# Patient Record
Sex: MALE | Race: WHITE | NOT HISPANIC OR LATINO | Employment: UNEMPLOYED | URBAN - METROPOLITAN AREA
[De-identification: names, ages, dates, MRNs, and addresses within clinical notes are randomized per-mention and may not be internally consistent; named-entity substitution may affect disease eponyms.]

---

## 2018-01-16 NOTE — PROCEDURES
Procedures by Dhiraj Johnson MD at 8/2/2016  2:37 PM      Author:  Dhiraj Johnson MD Service:  Interventional Radiology  Author Type:  Physician     Filed:  8/2/2016  2:41 PM Date of Service:  8/2/2016  2:37 PM Status:  Signed     :  Dhiraj Johnson MD (Physician)         Procedure Orders:       1  Insert PICC line [[id number]] ordered by Dhiraj Johnson MD at 08/02/16 1437                    PICC Line Insertion  Date/Time: 8/2/2016 2:37 PM  Performed by: Ismael Frankel  Authorized by: Ismael Frankel     Patient location: Radiology  Other Assisting Provider:  No    Consent:     Consent obtained:  Written    Consent given by: Grandmother  Universal protocol:     Procedure explained and questions answered to patient or proxy's satisfaction: yes      Relevant documents present and verified: yes      Test results available and properly labeled: yes       Imaging studies available: yes      Required blood products, implants, devices, and special equipment available: yes      Site/side marked: no      Immediately prior to procedure, a time out was called: yes      Patient identity confirmed:  Arm band  Pre-procedure details:     Hand hygiene: Hand hygiene performed prior to insertion      Sterile barrier technique: All elements of maximal sterile technique followed    Indications:     PICC line indications: vascular access    Anesthesia (see MAR for exact dosages):      Anesthesia method:  Local infiltration    Local anesthetic:  Lidocaine 1% w/o epi  Procedure details:     Location:  Basilic    Vessel type: vein      Laterality:  Right    Approach: percutaneous technique used      Patient position:  Flat    Procedural supplies:  Double lumen    Catheter size:  5 5 Fr    Landmarks identified: yes      Ultrasound guidance: yes      Sterile ultrasound techniques: Sterile gel and sterile probe covers were used      Number of attempts:  1    Successful placement: yes      Vessel of catheter tip end:  Right axillary vein    Total catheter length (cm):  23    Catheter out on skin (cm):  0  Post-procedure details:     Post-procedure:  Dressing applied    Assessment:  Blood return through all ports and free fluid flow    Post-procedure complications: none      Patient tolerance of procedure: Tolerated well, no immediate complications  Comments:      Wire could not be passed to the SVC and the PICC is positioned in the right axillary vein with good blood flow                   Received for:Provider  EPIC   Aug  2 2016  2:40PM [facility] Standard Time

## 2018-08-27 ENCOUNTER — HOSPITAL ENCOUNTER (EMERGENCY)
Facility: HOSPITAL | Age: 33
Discharge: HOME | End: 2018-08-28
Attending: EMERGENCY MEDICINE
Payer: COMMERCIAL

## 2018-08-27 DIAGNOSIS — F10.10 ALCOHOL ABUSE: Primary | ICD-10-CM

## 2018-08-27 LAB
ALBUMIN SERPL BCP-MCNC: 3.8 G/DL (ref 3.5–5)
ALP SERPL-CCNC: 244 U/L (ref 46–116)
ALT SERPL W P-5'-P-CCNC: 64 U/L (ref 12–78)
AMMONIA PLAS-SCNC: 58 UMOL/L (ref 11–35)
ANION GAP SERPL CALCULATED.3IONS-SCNC: 9 MMOL/L (ref 4–13)
AST SERPL W P-5'-P-CCNC: 120 U/L (ref 5–45)
BASOPHILS # BLD AUTO: 0.02 THOUSANDS/ΜL (ref 0–0.1)
BASOPHILS NFR BLD AUTO: 0 % (ref 0–1)
BILIRUB DIRECT SERPL-MCNC: 2.6 MG/DL (ref 0–0.2)
BILIRUB SERPL-MCNC: 6 MG/DL (ref 0.2–1)
BUN SERPL-MCNC: 7 MG/DL (ref 5–25)
CALCIUM SERPL-MCNC: 9.1 MG/DL (ref 8.3–10.1)
CHLORIDE SERPL-SCNC: 100 MMOL/L (ref 100–108)
CO2 SERPL-SCNC: 26 MMOL/L (ref 21–32)
CREAT SERPL-MCNC: 1.05 MG/DL (ref 0.6–1.3)
EOSINOPHIL # BLD AUTO: 0.24 THOUSAND/ΜL (ref 0–0.61)
EOSINOPHIL NFR BLD AUTO: 3 % (ref 0–6)
ERYTHROCYTE [DISTWIDTH] IN BLOOD BY AUTOMATED COUNT: 16.8 % (ref 11.6–15.1)
ETHANOL SERPL-MCNC: 151 MG/DL (ref 0–3)
GFR SERPL CREATININE-BSD FRML MDRD: 94 ML/MIN/1.73SQ M
GLUCOSE SERPL-MCNC: 99 MG/DL (ref 65–140)
HCT VFR BLD AUTO: 37.7 % (ref 36.5–49.3)
HGB BLD-MCNC: 12.3 G/DL (ref 12–17)
IMM GRANULOCYTES # BLD AUTO: 0.03 THOUSAND/UL (ref 0–0.2)
IMM GRANULOCYTES NFR BLD AUTO: 0 % (ref 0–2)
LYMPHOCYTES # BLD AUTO: 1.42 THOUSANDS/ΜL (ref 0.6–4.47)
LYMPHOCYTES NFR BLD AUTO: 17 % (ref 14–44)
MCH RBC QN AUTO: 28.4 PG (ref 26.8–34.3)
MCHC RBC AUTO-ENTMCNC: 32.6 G/DL (ref 31.4–37.4)
MCV RBC AUTO: 87 FL (ref 82–98)
MONOCYTES # BLD AUTO: 0.84 THOUSAND/ΜL (ref 0.17–1.22)
MONOCYTES NFR BLD AUTO: 10 % (ref 4–12)
NEUTROPHILS # BLD AUTO: 5.68 THOUSANDS/ΜL (ref 1.85–7.62)
NEUTS SEG NFR BLD AUTO: 70 % (ref 43–75)
NRBC BLD AUTO-RTO: 0 /100 WBCS
PLATELET # BLD AUTO: 67 THOUSANDS/UL (ref 149–390)
POTASSIUM SERPL-SCNC: 3.7 MMOL/L (ref 3.5–5.3)
PROT SERPL-MCNC: 7.9 G/DL (ref 6.4–8.2)
RBC # BLD AUTO: 4.33 MILLION/UL (ref 3.88–5.62)
SODIUM SERPL-SCNC: 135 MMOL/L (ref 136–145)
WBC # BLD AUTO: 8.23 THOUSAND/UL (ref 4.31–10.16)

## 2018-08-27 PROCEDURE — G0480 DRUG TEST DEF 1-7 CLASSES: HCPCS | Performed by: EMERGENCY MEDICINE

## 2018-08-27 PROCEDURE — 80320 DRUG SCREEN QUANTALCOHOLS: CPT | Performed by: EMERGENCY MEDICINE

## 2018-08-27 PROCEDURE — 83735 ASSAY OF MAGNESIUM: CPT | Performed by: EMERGENCY MEDICINE

## 2018-08-27 PROCEDURE — 96361 HYDRATE IV INFUSION ADD-ON: CPT

## 2018-08-27 PROCEDURE — 84100 ASSAY OF PHOSPHORUS: CPT | Performed by: EMERGENCY MEDICINE

## 2018-08-27 PROCEDURE — 82140 ASSAY OF AMMONIA: CPT | Performed by: EMERGENCY MEDICINE

## 2018-08-27 PROCEDURE — 85025 COMPLETE CBC W/AUTO DIFF WBC: CPT | Performed by: EMERGENCY MEDICINE

## 2018-08-27 PROCEDURE — 80053 COMPREHEN METABOLIC PANEL: CPT | Performed by: EMERGENCY MEDICINE

## 2018-08-27 PROCEDURE — 82248 BILIRUBIN DIRECT: CPT | Performed by: EMERGENCY MEDICINE

## 2018-08-27 PROCEDURE — 36415 COLL VENOUS BLD VENIPUNCTURE: CPT

## 2018-08-27 PROCEDURE — 96375 TX/PRO/DX INJ NEW DRUG ADDON: CPT

## 2018-08-27 RX ORDER — ONDANSETRON 2 MG/ML
4 INJECTION INTRAMUSCULAR; INTRAVENOUS ONCE
Status: COMPLETED | OUTPATIENT
Start: 2018-08-27 | End: 2018-08-27

## 2018-08-27 RX ORDER — CHLORDIAZEPOXIDE HYDROCHLORIDE 25 MG/1
50 CAPSULE, GELATIN COATED ORAL ONCE
Status: COMPLETED | OUTPATIENT
Start: 2018-08-28 | End: 2018-08-28

## 2018-08-27 RX ADMIN — SODIUM CHLORIDE 1000 ML: 0.9 INJECTION, SOLUTION INTRAVENOUS at 23:25

## 2018-08-27 RX ADMIN — ONDANSETRON 4 MG: 2 INJECTION INTRAMUSCULAR; INTRAVENOUS at 23:33

## 2018-08-28 VITALS
OXYGEN SATURATION: 97 % | SYSTOLIC BLOOD PRESSURE: 115 MMHG | TEMPERATURE: 98.9 F | WEIGHT: 170 LBS | DIASTOLIC BLOOD PRESSURE: 62 MMHG | BODY MASS INDEX: 23.8 KG/M2 | HEART RATE: 82 BPM | RESPIRATION RATE: 16 BRPM | HEIGHT: 71 IN

## 2018-08-28 LAB
MAGNESIUM SERPL-MCNC: 1.6 MG/DL (ref 1.6–2.6)
PHOSPHATE SERPL-MCNC: 4.3 MG/DL (ref 2.7–4.5)

## 2018-08-28 PROCEDURE — 96365 THER/PROPH/DIAG IV INF INIT: CPT

## 2018-08-28 PROCEDURE — 93005 ELECTROCARDIOGRAM TRACING: CPT

## 2018-08-28 PROCEDURE — 96366 THER/PROPH/DIAG IV INF ADDON: CPT

## 2018-08-28 PROCEDURE — 96375 TX/PRO/DX INJ NEW DRUG ADDON: CPT

## 2018-08-28 PROCEDURE — 99285 EMERGENCY DEPT VISIT HI MDM: CPT

## 2018-08-28 RX ORDER — ALPRAZOLAM 0.5 MG/1
0.5 TABLET ORAL ONCE
Status: COMPLETED | OUTPATIENT
Start: 2018-08-28 | End: 2018-08-28

## 2018-08-28 RX ORDER — CHLORDIAZEPOXIDE HYDROCHLORIDE 25 MG/1
50 CAPSULE, GELATIN COATED ORAL ONCE
Status: COMPLETED | OUTPATIENT
Start: 2018-08-28 | End: 2018-08-28

## 2018-08-28 RX ORDER — LIDOCAINE 50 MG/G
1 PATCH TOPICAL ONCE
Status: COMPLETED | OUTPATIENT
Start: 2018-08-28 | End: 2018-08-28

## 2018-08-28 RX ORDER — KETOROLAC TROMETHAMINE 30 MG/ML
30 INJECTION, SOLUTION INTRAMUSCULAR; INTRAVENOUS ONCE
Status: COMPLETED | OUTPATIENT
Start: 2018-08-28 | End: 2018-08-28

## 2018-08-28 RX ORDER — OXYCODONE HYDROCHLORIDE 5 MG/1
5 CAPSULE ORAL EVERY 4 HOURS PRN
COMMUNITY

## 2018-08-28 RX ORDER — FOLIC ACID 1 MG/1
1 TABLET ORAL ONCE
Status: COMPLETED | OUTPATIENT
Start: 2018-08-28 | End: 2018-08-28

## 2018-08-28 RX ADMIN — CHLORDIAZEPOXIDE HYDROCHLORIDE 50 MG: 25 CAPSULE ORAL at 00:06

## 2018-08-28 RX ADMIN — KETOROLAC TROMETHAMINE 30 MG: 30 INJECTION, SOLUTION INTRAMUSCULAR at 00:26

## 2018-08-28 RX ADMIN — ALPRAZOLAM 0.5 MG: 0.5 TABLET ORAL at 10:21

## 2018-08-28 RX ADMIN — CHLORDIAZEPOXIDE HYDROCHLORIDE 50 MG: 25 CAPSULE ORAL at 03:56

## 2018-08-28 RX ADMIN — LIDOCAINE 1 PATCH: 50 PATCH TOPICAL at 03:56

## 2018-08-28 RX ADMIN — FOLIC ACID 1 MG: 1 TABLET ORAL at 00:56

## 2018-08-28 RX ADMIN — MAGNESIUM SULFATE HEPTAHYDRATE: 500 INJECTION, SOLUTION INTRAMUSCULAR; INTRAVENOUS at 00:58

## 2018-08-28 NOTE — ED PROVIDER NOTES
History  Chief Complaint   Patient presents with    Alcohol Problem     Pt states he he wants to stop drinking  Pt hasn't had a drink in several hours  30-year-old male presents for evaluation and treatment of alcoholism  Patient states he wants detox  Was at [facility] for detox but did not like what they were doing  Last drink several hours ago  Patient states he noticed his eyes were turning yellow and really wants to get some help  No history of seizures, no nausea, no vomiting, no obvious signs of trauma  Patient has chronic back pain and is complaining of pain  History provided by:  Patient  Alcohol Problem   Severity:  Moderate  Onset quality:  Unable to specify  Timing:  Constant  Progression:  Worsening  Chronicity:  Chronic  Associated symptoms: fatigue and nausea    Associated symptoms: no abdominal pain, no chest pain, no congestion, no fever, no headaches, no loss of consciousness, no myalgias, no rash, no shortness of breath, no vomiting and no wheezing        Prior to Admission Medications   Prescriptions Last Dose Informant Patient Reported? Taking? Multiple Vitamin (MULTIVITAMIN) tablet   Yes No   Sig: Take 1 tablet by mouth daily  Facility-Administered Medications: None       Past Medical History:   Diagnosis Date    Back pain     ETOH abuse     Kidney failure 2013    Liver failure ([facility] [address] ) 2013       Past Surgical History:   Procedure Laterality Date    ORTHOPEDIC SURGERY Left     acl reconstruction       No family history on file  I have reviewed and agree with the history as documented  Social History   Substance Use Topics    Smoking status: Current Every Day Smoker     Packs/day: 0 20     Types: Cigarettes    Smokeless tobacco: Not on file    Alcohol use Yes      Comment: 12 pack of beer and a few shots daily         Review of Systems   Constitutional: Positive for fatigue  Negative for fever  HENT: Negative for congestion  Eyes:         Icteric   Respiratory: Negative for shortness of breath, wheezing and stridor  Cardiovascular: Negative for chest pain and palpitations  Gastrointestinal: Positive for nausea  Negative for abdominal pain and vomiting  Genitourinary: Negative  Musculoskeletal: Negative  Negative for myalgias  Skin: Negative  Negative for rash  Neurological: Negative for loss of consciousness and headaches  Hematological: Negative  Psychiatric/Behavioral: Negative  Negative for confusion  All other systems reviewed and are negative  Physical Exam  Physical Exam   Constitutional: He is oriented to person, place, and time  He appears well-developed and well-nourished  HENT:   Head: Normocephalic and atraumatic  Right Ear: External ear normal    Left Ear: External ear normal    Nose: Nose normal    Mouth/Throat: Oropharynx is clear and moist    Eyes: Conjunctivae, EOM and lids are normal  Scleral icterus is present  Neck: Normal range of motion  Neck supple  Cardiovascular: Normal rate, regular rhythm, normal heart sounds and intact distal pulses  Pulmonary/Chest: Effort normal and breath sounds normal    Abdominal: Soft  Bowel sounds are normal    Musculoskeletal: Normal range of motion  Neurological: He is alert and oriented to person, place, and time  Skin: Skin is warm and dry  Capillary refill takes less than 2 seconds  Psychiatric: He has a normal mood and affect  His behavior is normal  Judgment and thought content normal    Nursing note and vitals reviewed        Vital Signs  ED Triage Vitals   Temperature Pulse Respirations Blood Pressure SpO2   08/27/18 2312 08/27/18 2312 08/27/18 2312 08/27/18 2315 08/27/18 2312   98 9 °F (37 2 °C) 96 18 121/71 97 %      Temp Source Heart Rate Source Patient Position - Orthostatic VS BP Location FiO2 (%)   08/27/18 2312 08/27/18 2312 08/27/18 2312 08/27/18 2312 --   Oral Monitor Lying Left arm       Pain Score       08/27/18 2312       No Pain           Vitals: 08/28/18 0135 08/28/18 0145 08/28/18 0200 08/28/18 0215   BP: 120/62 120/62     Pulse: 94 (!) 106 86 86   Patient Position - Orthostatic VS: Lying          Visual Acuity      ED Medications  Medications   magnesium sulfate 2 g, thiamine (VITAMIN B1) 100 mg in sodium chloride 0 9 % 1,000 mL infusion ( Intravenous New Bag 8/28/18 0058)   sodium chloride 0 9 % bolus 1,000 mL (0 mL Intravenous Stopped 8/28/18 0015)   ondansetron (ZOFRAN) injection 4 mg (4 mg Intravenous Given 8/27/18 2333)   chlordiazePOXIDE (LIBRIUM) capsule 50 mg (50 mg Oral Given 8/28/18 0006)   ketorolac (TORADOL) injection 30 mg (30 mg Intravenous Given 0/53/49 2854)   folic acid (FOLVITE) tablet 1 mg (1 mg Oral Given 8/28/18 0056)       Diagnostic Studies  Results Reviewed     Procedure Component Value Units Date/Time    Magnesium [[id number]]  (Normal) Collected:  08/27/18 2325    Lab Status:  Final result Specimen:  Blood from Arm, Right Updated:  08/28/18 0002     Magnesium 1 6 mg/dL     Phosphorus [[id number]]  (Normal) Collected:  08/27/18 2325    Lab Status:  Final result Specimen:  Blood from Arm, Right Updated:  08/28/18 0002     Phosphorus 4 3 mg/dL     CBC and differential [[id number]]  (Abnormal) Collected:  08/27/18 2325    Lab Status:  Final result Specimen:  Blood from Arm, Right Updated:  08/27/18 2354     WBC 8 23 Thousand/uL      RBC 4 33 Million/uL      Hemoglobin 12 3 g/dL      Hematocrit 37 7 %      MCV 87 fL      MCH 28 4 pg      MCHC 32 6 g/dL      RDW 16 8 (H) %      Platelets 67 (L) Thousands/uL      nRBC 0 /100 WBCs      Neutrophils Relative 70 %      Immat GRANS % 0 %      Lymphocytes Relative 17 %      Monocytes Relative 10 %      Eosinophils Relative 3 %      Basophils Relative 0 %      Neutrophils Absolute 5 68 Thousands/µL      Immature Grans Absolute 0 03 Thousand/uL      Lymphocytes Absolute 1 42 Thousands/µL      Monocytes Absolute 0 84 Thousand/µL      Eosinophils Absolute 0 24 Thousand/µL      Basophils Absolute 0 02 Thousands/µL     Narrative: This is an appended report  These results have been appended to a previously verified report  Ammonia [[id number]]  (Abnormal) Collected:  08/27/18 2335    Lab Status:  Final result Specimen:  Blood from Arm, Right Updated:  08/27/18 2351     Ammonia 58 (H) umol/L     Comprehensive metabolic panel [[id number]]  (Abnormal) Collected:  08/27/18 2325    Lab Status:  Final result Specimen:  Blood from Arm, Right Updated:  08/27/18 2349     Sodium 135 (L) mmol/L      Potassium 3 7 mmol/L      Chloride 100 mmol/L      CO2 26 mmol/L      ANION GAP 9 mmol/L      BUN 7 mg/dL      Creatinine 1 05 mg/dL      Glucose 99 mg/dL      Calcium 9 1 mg/dL       (H) U/L      ALT 64 U/L      Alkaline Phosphatase 244 (H) U/L      Total Protein 7 9 g/dL      Albumin 3 8 g/dL      Total Bilirubin 6 00 (H) mg/dL      eGFR 94 ml/min/1 73sq m     Narrative:         National Kidney Disease Education Program recommendations are as follows:  GFR calculation is accurate only with a steady state creatinine  Chronic Kidney disease less than 60 ml/min/1 73 sq  meters  Kidney failure less than 15 ml/min/1 73 sq  meters  Bilirubin, direct [[id number]]  (Abnormal) Collected:  08/27/18 2325    Lab Status:  Final result Specimen:  Blood from Arm, Right Updated:  08/27/18 2349     Bilirubin, Direct 2 60 (H) mg/dL     Ethanol [[id number]]  (Abnormal) Collected:  08/27/18 2327    Lab Status:  Final result Specimen:  Blood from Arm, Right Updated:  08/27/18 2343     Ethanol Lvl 151 (H) mg/dL                  No orders to display              Procedures  Procedures       Phone Contacts  ED Phone Contact    ED Course                               MDM  CritCare Time    Disposition  Final diagnoses:   None     ED Disposition     None      Follow-up Information    None         Patient's Medications   Discharge Prescriptions    No medications on file     No discharge procedures on file      ED Provider  Electronically Signed by           Sugey Cheung MD  08/28/18 0439

## 2018-08-28 NOTE — ED NOTES
8/28/18 @ 1005: After accessing patient's insurance, it was noted that [facility] is "in-network "  PES called [facility], bed available; will fax referral   Marie Krueger Dariel [address]    1030: PES faxed referral; patient said, "I'm not feeling well; my liver is killing me "  Patient's RN aware and will consult with MD Dillan Malik will continue to monitor and follow up on referral to [facility]  MS Ale  1200: At patient's request, letter was sent to patient's employer @ [phone], to confirm that patient was in the hospital   Marie Krueger [address]  1213: Called [facility] for update:  [facility]  Keya Suarez, [facility]: Patient accepted at [facility] by Dr Dhaval Todd; PES will set up transport  RN-RN:  [phone]  Keya Suarez [facility]: PES set up transport with [facility] @ 2300; ED staff, patient, and [facility] notified  Keya Suarez, [address] [place] [address]: Patient changed his mind and decided to leave; PES cancelled admission to [facility] and [facility] transport  ED MD agreeable and will discharge patient to home    Keya Suarez MS

## 2018-08-28 NOTE — ED NOTES
8/28/18 @ 1000:  PES met with 33year-old white male, who self-presented due to ETOH abuse  Patient's BAL was 151 upon admission @ [phone]  Patient has long history of alcohol abuse since the age of 14  Patient had a 1 5 year sobriety, but starting drinking again 3 weeks ago, saying, "I thought I could have a few drinks, but that wasn't the case; now I'm drinking 2-3 pints of vodka straight, daily "  Patient has been to [address] [place] [address] for detox, and has also been admitted medically at [facility] in [facility], which is what led to his sobriety  Patient is holding down a job for 6 months and wants to "stop drinking "  Patient has been started on Librium protocol due to withdrawal symptoms, which include:  Sweating, shaking, and nausea; patient's vital signs have been stable, but he has been medicated  Patient is seeking detox for his alcohol use  Patient denies any history of mental illness, SI/HI, or suicide attempts  Patient denies any significant family history of alcohol abuse  PES will access insurance and begin bed search        Alcohol intoxication with severe use disorder:  F10 229    MS Ale

## 2018-08-28 NOTE — ED CARE HANDOFF
Emergency Department Sign Out Note        Sign out and transfer of care from Dr Oswald Juarez  See Separate Emergency Department note  The patient, Bola Simpson, was evaluated by the previous provider for alcohol withdrawal and rehab placement  Workup Completed:  Patient is medically cleared for evaluation for his alcohol abuse and rehab    ED Course / Workup Pending (followup): Patient's lab work is all normal he has multiple complaints continually but there is no obvious acute medical issue                             Procedures  MDM  CritCare Time      Disposition  Final diagnoses:   Alcohol abuse     Time reflects when diagnosis was documented in both MDM as applicable and the Disposition within this note     Time User Action Codes Description Comment    8/28/2018  3:03 PM Cara Liao Add [F10 10] Alcohol abuse       ED Disposition     ED Disposition Condition Comment    Discharge  Patient was accepted to a rehab facility but then changed his mind and no longer wants to be transferred to the rehab facility he wants to be discharged instead        MD Documentation      Most Recent Value   Patient Condition  The patient has been stabilized such that within reasonable medical probability, no material deterioration of the patient condition or the condition of the unborn child(efrain) is likely to result from the transfer   Reason for Transfer  Level of Care needed not available at this facility   Benefits of Transfer  Specialized equipment and/or services available at the receiving facility (Include comment)________________________   Risks of Transfer  Potential for delay in receiving treatment   Accepting Physician  Dr Shavonne Richards Name, [address] [place] [address]   Transported by (Company and Unit #)  [facility]ari[facility] Porras   Sending MD  [facility]   Provider Certification  General risk, such as traffic hazards, adverse weather conditions, rough terrain or turbulence, possible failure of equipment (including vehicle or aircraft), or consequences of actions of persons outside the control of the transport personnel      RN Documentation      [facility] [address], [address] [facility]   Transport Mode  Ambulance   Transported by [employer] and Unit #)  [facility]   Level of Care  Basic life support   Copies of Medical Records Sent  History and Physical, Orders, Progress note   Patient Belongings Disposition  Sent with patient   Transfer Date  08/28/18      Follow-up Information     Follow up With Specialties Details Why [address], MD Family Medicine  As needed [facility]  [phone]          Discharge Medication List as of 8/28/2018  4:39 PM      CONTINUE these medications which have NOT CHANGED    Details   Multiple Vitamin (MULTIVITAMIN) tablet Take 1 tablet by mouth daily  , Until Discontinued, Historical Med      oxyCODONE (OXY-IR) 5 MG capsule Take 5 mg by mouth every 4 (four) hours as needed for moderate pain, Historical Med           No discharge procedures on file         ED Provider  Electronically Signed by     Shobha Pereira MD  09/01/18 5368

## 2018-08-28 NOTE — ED NOTES
8/28/18 @ 1235:  Patient presents anxious and experiencing ETOH withdrawal symptoms, in the form of shaking, sweating, anxiety, and overall malaise  Patient has been started on Librium Protocol  Patient is asking for help to "stop drinking "  Patient is otherwise calm and cooperative with mildly slurred speech and blunted affect  Patient has complete understanding of his request for treatment, in that he wants to stop drinking  Patient is oriented x4; reports decreased appetite and sleep; presents with appropriate appearance; patients impulse control and judgement are poor  Patient denies SI/HI, or any psychosis        Trey Rai MS

## 2018-08-28 NOTE — DISCHARGE INSTRUCTIONS
Abuse of Alcohol   AMBULATORY CARE:   Alcohol abuse   · is unhealthy drinking behavior  You may drink too much at one time once a week, or continue to drink too much daily  You continue to drink even though it causes problems  The problems can be alcohol related legal problems or problems with work or family  · If you drink too much at one time, you are binge drinking  Binge drinking is when you have a large amount of alcohol in a short time  Your blood alcohol concentrations (MAURO) goes above 0 08 g/dLlevel during binge drinking  For men, this usually happens with more than 4 drinks in 2 hours  For women, it is more than 3 drinks in 2 hours  A drink is 12 ounces of beer, 4 ounces of wine, or 1½ ounces of liquor  Common signs and symptoms of alcohol abuse include:  Each person that abuses alcohol may have different symptoms  The following are common signs and symptoms of alcohol abuse:  · Loss of interest in activities, work, and school    · Decreased interest in family and friends    · Depression    · Constant thoughts about drinking    · Not able to control the amount you drink    · Restlessness, or erratic and violent behavior  Call 911 for any of the following:   · You have sudden chest pain or trouble breathing  · You have a seizure or have shaking or trembling  · You feel like you could harm yourself or others  · You were in an accident because of alcohol  Seek care immediately if:   · You have hallucinations (you see or hear things that are not real)  · You cannot stop vomiting or you vomit blood  Contact your healthcare provider if:   · You need help to stop drinking alcohol  · You have questions or concerns about your condition or care    Long-term effects of alcohol abuse:   · Blackouts    · Memory loss    · Dementia    · Liver disease    · Thiamine (vitamin B1) deficiency  Treatment for alcohol abuse  may include the following:  · Detoxification (detox) and withdrawal  is a program that helps you to safely get alcohol out of your body  Detox can also help get rid of the physical need to drink  Healthcare providers monitor the physical symptoms of withdrawal  They may give you medicines to help decrease nausea, dehydration, and seizures  Healthcare providers will also monitor your blood pressure, heart and breathing rates, and your temperature  Symptoms of anxiety, depression, and suicidal thoughts are also monitored and managed during detox  Healthcare providers may give you medicines for these symptoms and therapy sessions will be available to you  Detox is usually done at a detox center or in a hospital  Healthcare providers do not recommend that you try to detox at home or by yourself  Withdrawal symptoms may become life threatening  The center can help you find 12 step programs or an individual therapist to help with emotional support after detox  · Inpatient and outpatient treatment  focus on your personal needs to help you stop drinking  Treatment helps you understand the reasons you abuse alcohol  Counselors and therapists provide you with support and help you find ways to cope instead of drinking  You may need inpatient treatment to provide a controlled environment  You may need outpatient treatment after your inpatient treatment is complete  · Alcohol aversion therapy  takes away the desire to drink by causing a negative reaction when you drink  Healthcare providers may give you medicines that cause nausea and vomiting when you drink alcohol  They may instead give you a medicine that decreases your urge to drink alcohol  These medicines are used to help you stop drinking or reduce the amount you drink  They can also help you avoid relapse  Risks of alcohol abuse:  Alcohol abuse increases your risk for gastrointestinal cancers, brain damage and problems with your immune system  It also increases your risk for heart, kidney, and lung damage   The risk of stroke increases with alcohol abuse  If you are pregnant and drink alcohol, you and your baby are at risk for serious health problems  Avoid alcohol:  You should stop drinking entirely  Alcohol can damage your brain, heart, and liver  It also increases your risk for injury, high blood pressure, and certain types of cancer  Alcohol is dangerous when you combine it with certain medicines  Do not drive if you drink alcohol:  Make sure someone who has not been drinking can help you get home  Get support:  Most people need support to stop drinking alcohol  Mental health providers, support groups, rehabilitation centers, and your healthcare provider can provide support  For more information:   · Alcoholics Anonymous  Web Address: http://www [employer]/  · Substance Abuse and [address] , [address] [place]  Web Address: https://[employer]/  Follow up with your healthcare provider as directed:  Write down your questions so you remember to ask them during your visits  © 2017 [address] Information is for End User's use only and may not be sold, redistributed or otherwise used for commercial purposes  All illustrations and images included in CareNotes® are the copyrighted property of A D A M , Inc  or [employer]  The above information is an  only  It is not intended as medical advice for individual conditions or treatments  Talk to your doctor, nurse or pharmacist before following any medical regimen to see if it is safe and effective for you

## 2018-08-29 LAB
ATRIAL RATE: 76 BPM
P AXIS: 37 DEGREES
PR INTERVAL: 156 MS
QRS AXIS: 78 DEGREES
QRSD INTERVAL: 100 MS
QT INTERVAL: 398 MS
QTC INTERVAL: 447 MS
T WAVE AXIS: 45 DEGREES
VENTRICULAR RATE: 76 BPM

## 2018-08-29 PROCEDURE — 93010 ELECTROCARDIOGRAM REPORT: CPT | Performed by: INTERNAL MEDICINE
